# Patient Record
Sex: MALE | Race: WHITE | NOT HISPANIC OR LATINO | URBAN - METROPOLITAN AREA
[De-identification: names, ages, dates, MRNs, and addresses within clinical notes are randomized per-mention and may not be internally consistent; named-entity substitution may affect disease eponyms.]

---

## 2017-04-19 ENCOUNTER — COMMUNICATION - HEALTHEAST (OUTPATIENT)
Dept: TELEHEALTH | Facility: CLINIC | Age: 62
End: 2017-04-19

## 2017-04-19 ENCOUNTER — RECORDS - HEALTHEAST (OUTPATIENT)
Dept: ADMINISTRATIVE | Facility: OTHER | Age: 62
End: 2017-04-19

## 2017-04-19 ENCOUNTER — HOSPITAL ENCOUNTER (OUTPATIENT)
Dept: CT IMAGING | Facility: CLINIC | Age: 62
Discharge: HOME | End: 2017-04-19

## 2017-04-19 ENCOUNTER — RECORDS - HEALTHEAST (OUTPATIENT)
Dept: LAB | Facility: CLINIC | Age: 62
End: 2017-04-19

## 2017-04-19 DIAGNOSIS — N40.2 PROSTATE NODULE: ICD-10-CM

## 2017-04-19 DIAGNOSIS — C79.51 BONE METASTASIS: ICD-10-CM

## 2017-04-19 LAB
CHOLEST SERPL-MCNC: 228 MG/DL
FASTING STATUS PATIENT QL REPORTED: YES
HDLC SERPL-MCNC: 30 MG/DL
LDLC SERPL CALC-MCNC: 137 MG/DL
PSA SERPL-MCNC: 149.2 NG/ML (ref 0–4.5)
TRIGL SERPL-MCNC: 303 MG/DL

## 2017-04-20 LAB — HCV AB SERPL QL IA: NEGATIVE

## 2017-04-21 ENCOUNTER — RECORDS - HEALTHEAST (OUTPATIENT)
Dept: ADMINISTRATIVE | Facility: OTHER | Age: 62
End: 2017-04-21

## 2017-04-21 ENCOUNTER — HOSPITAL ENCOUNTER (OUTPATIENT)
Dept: CT IMAGING | Facility: CLINIC | Age: 62
Discharge: HOME | End: 2017-04-21

## 2017-04-21 DIAGNOSIS — C61 PROSTATE CANCER (H): ICD-10-CM

## 2017-04-21 LAB
ALBUMIN PERCENT: 64.4 % (ref 51–67)
ALBUMIN SERPL ELPH-MCNC: 4.5 G/DL (ref 3.2–4.7)
ALPHA 1 PERCENT: 2.6 % (ref 2–4)
ALPHA 2 PERCENT: 11.8 % (ref 5–13)
ALPHA1 GLOB SERPL ELPH-MCNC: 0.2 G/DL (ref 0.1–0.3)
ALPHA2 GLOB SERPL ELPH-MCNC: 0.8 G/DL (ref 0.4–0.9)
B-GLOBULIN SERPL ELPH-MCNC: 0.8 G/DL (ref 0.7–1.2)
BETA PERCENT: 11.9 % (ref 10–17)
GAMMA GLOB SERPL ELPH-MCNC: 0.7 G/DL (ref 0.6–1.4)
GAMMA GLOBULIN PERCENT: 9.3 % (ref 9–20)
PATH ICD:: NORMAL
PROT PATTERN SERPL ELPH-IMP: NORMAL
PROT SERPL-MCNC: 7 G/DL (ref 6–8)
REVIEWING PATHOLOGIST: NORMAL

## 2017-05-04 ENCOUNTER — RECORDS - HEALTHEAST (OUTPATIENT)
Dept: ADMINISTRATIVE | Facility: OTHER | Age: 62
End: 2017-05-04

## 2017-05-05 ENCOUNTER — HOSPITAL ENCOUNTER (OUTPATIENT)
Dept: INTERVENTIONAL RADIOLOGY/VASCULAR | Facility: HOSPITAL | Age: 62
Discharge: HOME | End: 2017-05-05
Attending: INTERNAL MEDICINE

## 2017-05-05 DIAGNOSIS — C61 PROSTATE CANCER (H): ICD-10-CM

## 2017-05-05 ASSESSMENT — MIFFLIN-ST. JEOR: SCORE: 1748.34

## 2017-05-08 ENCOUNTER — COMMUNICATION - HEALTHEAST (OUTPATIENT)
Dept: INTERVENTIONAL RADIOLOGY/VASCULAR | Facility: HOSPITAL | Age: 62
End: 2017-05-08

## 2017-08-10 ENCOUNTER — RECORDS - HEALTHEAST (OUTPATIENT)
Dept: ADMINISTRATIVE | Facility: OTHER | Age: 62
End: 2017-08-10

## 2017-09-07 ENCOUNTER — HOSPITAL ENCOUNTER (OUTPATIENT)
Dept: NUCLEAR MEDICINE | Facility: CLINIC | Age: 62
Discharge: HOME | End: 2017-09-07
Attending: INTERNAL MEDICINE

## 2017-09-07 DIAGNOSIS — C61 PROSTATE CANCER (H): ICD-10-CM

## 2017-09-08 ENCOUNTER — HOSPITAL ENCOUNTER (OUTPATIENT)
Dept: CT IMAGING | Facility: CLINIC | Age: 62
Discharge: HOME | End: 2017-09-08
Attending: INTERNAL MEDICINE

## 2017-09-08 DIAGNOSIS — C61 PROSTATE CANCER (H): ICD-10-CM

## 2017-09-11 ENCOUNTER — HOSPITAL ENCOUNTER (OUTPATIENT)
Dept: MRI IMAGING | Facility: HOSPITAL | Age: 62
Discharge: HOME | End: 2017-09-11
Attending: INTERNAL MEDICINE

## 2017-09-11 DIAGNOSIS — C61 PROSTATE CANCER (H): ICD-10-CM

## 2017-09-15 ENCOUNTER — RECORDS - HEALTHEAST (OUTPATIENT)
Dept: ADMINISTRATIVE | Facility: OTHER | Age: 62
End: 2017-09-15

## 2017-09-18 ENCOUNTER — HOSPITAL ENCOUNTER (OUTPATIENT)
Dept: INTERVENTIONAL RADIOLOGY/VASCULAR | Facility: HOSPITAL | Age: 62
Discharge: HOME | End: 2017-09-18
Attending: INTERNAL MEDICINE

## 2017-09-18 DIAGNOSIS — C61 PROSTATE CA (H): ICD-10-CM

## 2017-09-18 ASSESSMENT — MIFFLIN-ST. JEOR: SCORE: 1748.34

## 2017-10-24 ENCOUNTER — RECORDS - HEALTHEAST (OUTPATIENT)
Dept: ADMINISTRATIVE | Facility: OTHER | Age: 62
End: 2017-10-24

## 2017-12-18 ENCOUNTER — HOSPITAL ENCOUNTER (OUTPATIENT)
Dept: MRI IMAGING | Facility: HOSPITAL | Age: 62
Discharge: HOME | End: 2017-12-18
Attending: INTERNAL MEDICINE

## 2017-12-18 DIAGNOSIS — C61 PROSTATE CANCER (H): ICD-10-CM

## 2018-01-01 ENCOUNTER — HOSPITAL ENCOUNTER (OUTPATIENT)
Dept: CT IMAGING | Facility: HOSPITAL | Age: 63
Discharge: HOME | End: 2018-11-06
Attending: INTERNAL MEDICINE

## 2018-01-01 ENCOUNTER — HOSPITAL ENCOUNTER (OUTPATIENT)
Dept: CARDIOLOGY | Facility: CLINIC | Age: 63
Discharge: HOME | End: 2018-08-03

## 2018-01-01 ENCOUNTER — RECORDS - HEALTHEAST (OUTPATIENT)
Dept: ADMINISTRATIVE | Facility: OTHER | Age: 63
End: 2018-01-01

## 2018-01-01 ENCOUNTER — HOSPITAL ENCOUNTER (OUTPATIENT)
Dept: CT IMAGING | Facility: HOSPITAL | Age: 63
Discharge: HOME | End: 2018-11-30
Attending: INTERNAL MEDICINE | Admitting: RADIOLOGY

## 2018-01-01 ENCOUNTER — HOSPITAL ENCOUNTER (OUTPATIENT)
Dept: RADIOLOGY | Facility: HOSPITAL | Age: 63
Discharge: HOME | End: 2018-11-30
Attending: INTERNAL MEDICINE

## 2018-01-01 ENCOUNTER — HOSPITAL ENCOUNTER (OUTPATIENT)
Dept: MRI IMAGING | Facility: HOSPITAL | Age: 63
Discharge: HOME | End: 2018-11-06
Attending: INTERNAL MEDICINE

## 2018-01-01 ENCOUNTER — HOSPITAL ENCOUNTER (OUTPATIENT)
Dept: ULTRASOUND IMAGING | Facility: CLINIC | Age: 63
Discharge: HOME | End: 2018-08-02

## 2018-01-01 ENCOUNTER — HOSPITAL ENCOUNTER (OUTPATIENT)
Dept: MRI IMAGING | Facility: HOSPITAL | Age: 63
Discharge: HOME | End: 2018-08-14
Attending: INTERNAL MEDICINE

## 2018-01-01 DIAGNOSIS — Z98.890 S/P BIOPSY: ICD-10-CM

## 2018-01-01 DIAGNOSIS — C61 PROSTATE CANCER (H): ICD-10-CM

## 2018-01-01 DIAGNOSIS — R60.9 EDEMA: ICD-10-CM

## 2018-01-01 DIAGNOSIS — R91.1 LUNG NODULE: ICD-10-CM

## 2018-01-01 LAB
AORTIC ROOT: 3.2 CM
BSA FOR ECHO PROCEDURE: 2.24 M2
CREAT BLD-MCNC: 0.4 MG/DL
CREAT BLD-MCNC: 0.4 MG/DL (ref 0.7–1.3)
CREAT BLD-MCNC: 1.2 MG/DL (ref 0.7–1.3)
CV ECHO HEIGHT: 70 IN
CV ECHO WEIGHT: 224 LBS
DOP CALC LVOT AREA: 3.46 CM2
DOP CALC LVOT DIAMETER: 2.1 CM
DOP CALC LVOT PEAK VEL: 85 CM/S
DOP CALC LVOT STROKE VOLUME: 63.7 CM3
DOP CALCLVOT PEAK VEL VTI: 18.4 CM
EJECTION FRACTION: 66 % (ref 55–75)
FRACTIONAL SHORTENING: 26.1 % (ref 28–44)
HGB BLD-MCNC: 10.2 G/DL (ref 14–18)
INR PPP: 0.98 (ref 0.9–1.1)
INTERVENTRICULAR SEPTUM IN END DIASTOLE: 1 CM (ref 0.6–1)
IVS/PW RATIO: 0.8
LA AREA 1: 14.2 CM2
LA AREA 2: 13.3 CM2
LAB AP CHARGES (HE HISTORICAL CONVERSION): ABNORMAL
LAB AP INITIAL CYTO EVAL (HE HISTORICAL CONVERSION): ABNORMAL
LAB MED GENERAL PATH INTERP (HE HISTORICAL CONVERSION): ABNORMAL
LEFT ATRIUM LENGTH: 4.13 CM
LEFT ATRIUM SIZE: 3 CM
LEFT ATRIUM TO AORTIC ROOT RATIO: 0.94 NO UNITS
LEFT ATRIUM VOLUME INDEX: 17.4 ML/M2
LEFT ATRIUM VOLUME: 38.9 ML
LEFT VENTRICLE CARDIAC INDEX: 2.8 L/MIN/M2
LEFT VENTRICLE CARDIAC OUTPUT: 6.2 L/MIN
LEFT VENTRICLE DIASTOLIC VOLUME INDEX: 46.4 CM3/M2 (ref 34–74)
LEFT VENTRICLE DIASTOLIC VOLUME: 104 CM3 (ref 62–150)
LEFT VENTRICLE HEART RATE: 97 BPM
LEFT VENTRICLE MASS INDEX: 80.9 G/M2
LEFT VENTRICLE SYSTOLIC VOLUME INDEX: 15.6 CM3/M2 (ref 11–31)
LEFT VENTRICLE SYSTOLIC VOLUME: 35 CM3 (ref 21–61)
LEFT VENTRICULAR INTERNAL DIMENSION IN DIASTOLE: 4.6 CM (ref 4.2–5.8)
LEFT VENTRICULAR INTERNAL DIMENSION IN SYSTOLE: 3.4 CM (ref 2.5–4)
LEFT VENTRICULAR MASS: 181.2 G
LEFT VENTRICULAR OUTFLOW TRACT MEAN GRADIENT: 2 MMHG
LEFT VENTRICULAR OUTFLOW TRACT MEAN VELOCITY: 60.5 CM/S
LEFT VENTRICULAR OUTFLOW TRACT PEAK GRADIENT: 3 MMHG
LEFT VENTRICULAR POSTERIOR WALL IN END DIASTOLE: 1.2 CM (ref 0.6–1)
LV STROKE VOLUME INDEX: 28.4 ML/M2
MITRAL VALVE E/A RATIO: 0.6
MV AVERAGE E/E' RATIO: 7.5 CM/S
MV DECELERATION TIME: 77 MS
MV E'TISSUE VEL-LAT: 5.56 CM/S
MV E'TISSUE VEL-MED: 7.51 CM/S
MV LATERAL E/E' RATIO: 8.8
MV MEDIAL E/E' RATIO: 6.5
MV PEAK A VELOCITY: 77.5 CM/S
MV PEAK E VELOCITY: 48.9 CM/S
NUC REST DIASTOLIC VOLUME INDEX: 3584 LBS
NUC REST SYSTOLIC VOLUME INDEX: 70 IN
PATH REPORT.COMMENTS IMP SPEC: ABNORMAL
PATH REPORT.COMMENTS IMP SPEC: ABNORMAL
PATH REPORT.FINAL DX SPEC: ABNORMAL
PATH REPORT.MICROSCOPIC SPEC OTHER STN: ABNORMAL
PATH REPORT.RELEVANT HX SPEC: ABNORMAL
PLATELET # BLD AUTO: 84 THOU/UL (ref 140–440)
POC GFR AMER AF HE - HISTORICAL: >60 ML/MIN/1.73M2
POC GFR AMER AF HE - HISTORICAL: >60 ML/MIN/1.73M2
POC GFR NON AMER AF HE - HISTORICAL: 56 ML/MIN/1.73M2
POC GFR NON AMER AF HE - HISTORICAL: >60 ML/MIN/1.73M2
RESULT FLAG (HE HISTORICAL CONVERSION): ABNORMAL
RIGHT VENTRICULAR INTERNAL DIMENSION IN DYSTOLE: 3.4 CM
SPECIMEN DESCRIPTION: ABNORMAL
TRICUSPID VALVE ANULAR PLANE SYSTOLIC EXCURSION: 1.7 CM

## 2018-01-01 ASSESSMENT — MIFFLIN-ST. JEOR
SCORE: 1807.31
SCORE: 1807.31

## 2018-01-15 ENCOUNTER — RECORDS - HEALTHEAST (OUTPATIENT)
Dept: ADMINISTRATIVE | Facility: OTHER | Age: 63
End: 2018-01-15

## 2018-01-16 ENCOUNTER — HOSPITAL ENCOUNTER (OUTPATIENT)
Dept: CT IMAGING | Facility: CLINIC | Age: 63
Discharge: HOME | End: 2018-01-16
Attending: INTERNAL MEDICINE

## 2018-01-16 ENCOUNTER — HOSPITAL ENCOUNTER (OUTPATIENT)
Dept: NUCLEAR MEDICINE | Facility: CLINIC | Age: 63
Discharge: HOME | End: 2018-01-16
Attending: INTERNAL MEDICINE

## 2018-01-16 DIAGNOSIS — C61 PROSTATE CANCER (H): ICD-10-CM

## 2018-01-31 ENCOUNTER — RECORDS - HEALTHEAST (OUTPATIENT)
Dept: ADMINISTRATIVE | Facility: OTHER | Age: 63
End: 2018-01-31

## 2018-02-01 ENCOUNTER — HOSPITAL ENCOUNTER (OUTPATIENT)
Dept: INTERVENTIONAL RADIOLOGY/VASCULAR | Facility: HOSPITAL | Age: 63
Discharge: HOME | End: 2018-02-01
Attending: INTERNAL MEDICINE | Admitting: RADIOLOGY

## 2018-02-01 DIAGNOSIS — C61 PROSTATE CANCER (H): ICD-10-CM

## 2018-02-01 LAB
HGB BLD-MCNC: 13.8 G/DL (ref 14–18)
INR PPP: 1.06 (ref 0.9–1.1)
PLATELET # BLD AUTO: 219 THOU/UL (ref 140–440)

## 2018-02-01 ASSESSMENT — MIFFLIN-ST. JEOR: SCORE: 1807.31

## 2018-02-26 ENCOUNTER — RECORDS - HEALTHEAST (OUTPATIENT)
Dept: ADMINISTRATIVE | Facility: OTHER | Age: 63
End: 2018-02-26

## 2018-04-04 ENCOUNTER — HOSPITAL ENCOUNTER (OUTPATIENT)
Dept: MRI IMAGING | Facility: HOSPITAL | Age: 63
Discharge: HOME | End: 2018-04-04
Attending: INTERNAL MEDICINE

## 2018-04-04 ENCOUNTER — HOSPITAL ENCOUNTER (OUTPATIENT)
Dept: CT IMAGING | Facility: HOSPITAL | Age: 63
Discharge: HOME | End: 2018-04-04
Attending: INTERNAL MEDICINE

## 2018-04-04 DIAGNOSIS — C61 PROSTATE CANCER (H): ICD-10-CM

## 2018-04-04 LAB
CREAT BLD-MCNC: 0.8 MG/DL
POC GFR AMER AF HE - HISTORICAL: >60 ML/MIN/1.73M2
POC GFR NON AMER AF HE - HISTORICAL: >60 ML/MIN/1.73M2

## 2018-04-17 ENCOUNTER — RECORDS - HEALTHEAST (OUTPATIENT)
Dept: ADMINISTRATIVE | Facility: OTHER | Age: 63
End: 2018-04-17

## 2018-04-19 ENCOUNTER — HOSPITAL ENCOUNTER (OUTPATIENT)
Dept: MRI IMAGING | Facility: HOSPITAL | Age: 63
Discharge: HOME | End: 2018-04-19

## 2018-04-19 DIAGNOSIS — C61 PROSTATE CA (H): ICD-10-CM

## 2018-05-21 ENCOUNTER — RECORDS - HEALTHEAST (OUTPATIENT)
Dept: ADMINISTRATIVE | Facility: OTHER | Age: 63
End: 2018-05-21

## 2018-06-18 ENCOUNTER — HOSPITAL ENCOUNTER (OUTPATIENT)
Dept: CT IMAGING | Facility: HOSPITAL | Age: 63
Discharge: HOME | End: 2018-06-18
Attending: INTERNAL MEDICINE

## 2018-06-18 DIAGNOSIS — C61 PROSTATE CANCER (H): ICD-10-CM

## 2019-01-01 ENCOUNTER — HOME CARE/HOSPICE - HEALTHEAST (OUTPATIENT)
Dept: HOSPICE | Facility: HOSPICE | Age: 64
End: 2019-01-01

## 2019-01-01 ENCOUNTER — HOSPITAL ENCOUNTER (OUTPATIENT)
Dept: CT IMAGING | Facility: HOSPITAL | Age: 64
Discharge: HOME | End: 2019-05-07
Attending: NURSE PRACTITIONER

## 2019-01-01 ENCOUNTER — AMBULATORY - HEALTHEAST (OUTPATIENT)
Dept: SCHEDULING | Facility: CLINIC | Age: 64
End: 2019-01-01

## 2019-01-01 ENCOUNTER — RECORDS - HEALTHEAST (OUTPATIENT)
Dept: ADMINISTRATIVE | Facility: OTHER | Age: 64
End: 2019-01-01

## 2019-01-01 ENCOUNTER — RECORDS - HEALTHEAST (OUTPATIENT)
Dept: LAB | Facility: CLINIC | Age: 64
End: 2019-01-01

## 2019-01-01 ENCOUNTER — AMBULATORY - HEALTHEAST (OUTPATIENT)
Dept: HOSPICE | Facility: HOSPICE | Age: 64
End: 2019-01-01

## 2019-01-01 ENCOUNTER — INFUSION - HEALTHEAST (OUTPATIENT)
Dept: INFUSION THERAPY | Facility: HOSPITAL | Age: 64
End: 2019-01-01

## 2019-01-01 ENCOUNTER — HOSPITAL ENCOUNTER (OUTPATIENT)
Dept: CT IMAGING | Facility: HOSPITAL | Age: 64
Discharge: HOME | End: 2019-01-21
Attending: INTERNAL MEDICINE

## 2019-01-01 ENCOUNTER — AMBULATORY - HEALTHEAST (OUTPATIENT)
Dept: INFUSION THERAPY | Facility: HOSPITAL | Age: 64
End: 2019-01-01

## 2019-01-01 DIAGNOSIS — C61 PROSTATE CANCER (H): ICD-10-CM

## 2019-01-01 DIAGNOSIS — C61 PROSTATE CA (H): ICD-10-CM

## 2019-01-01 LAB
ABO/RH(D): NORMAL
ANION GAP SERPL CALCULATED.3IONS-SCNC: 9 MMOL/L (ref 5–18)
ANTIBODY SCREEN: NEGATIVE
BLD PROD TYP BPU: NORMAL
BLD PROD TYP BPU: NORMAL
BLOOD EXPIRATION DATE: NORMAL
BLOOD EXPIRATION DATE: NORMAL
BLOOD TYPE: 6200
BLOOD TYPE: 6200
BUN SERPL-MCNC: 22 MG/DL (ref 8–22)
CALCIUM SERPL-MCNC: 10.2 MG/DL (ref 8.5–10.5)
CHLORIDE BLD-SCNC: 105 MMOL/L (ref 98–107)
CO2 SERPL-SCNC: 26 MMOL/L (ref 22–31)
CODING SYSTEM: NORMAL
CODING SYSTEM: NORMAL
COMPONENT (HISTORICAL CONVERSION): NORMAL
COMPONENT (HISTORICAL CONVERSION): NORMAL
CREAT BLD-MCNC: 0.7 MG/DL (ref 0.7–1.3)
CREAT BLD-MCNC: 0.8 MG/DL
CREAT SERPL-MCNC: 0.86 MG/DL (ref 0.7–1.3)
CROSSMATCH: NORMAL
CROSSMATCH: NORMAL
GFR SERPL CREATININE-BSD FRML MDRD: >60 ML/MIN/1.73M2
GFR SERPL CREATININE-BSD FRML MDRD: >60 ML/MIN/1.73M2
GLUCOSE BLD-MCNC: 94 MG/DL (ref 70–125)
ISSUE DATE AND TIME: NORMAL
ISSUE DATE AND TIME: NORMAL
POC GFR AMER AF HE - HISTORICAL: >60 ML/MIN/1.73M2
POC GFR NON AMER AF HE - HISTORICAL: >60 ML/MIN/1.73M2
POTASSIUM BLD-SCNC: 4.8 MMOL/L (ref 3.5–5)
SODIUM SERPL-SCNC: 140 MMOL/L (ref 136–145)
STATUS (HISTORICAL CONVERSION): NORMAL
STATUS (HISTORICAL CONVERSION): NORMAL
UNIT ABO/RH (HISTORICAL CONVERSION): NORMAL
UNIT ABO/RH (HISTORICAL CONVERSION): NORMAL
UNIT NUMBER: NORMAL
UNIT NUMBER: NORMAL

## 2019-01-01 RX ORDER — MORPHINE SULFATE 100 MG/5ML
10 SOLUTION ORAL EVERY 4 HOURS
Status: SHIPPED | COMMUNITY
Start: 2019-01-01

## 2019-01-01 RX ORDER — LORAZEPAM 2 MG/ML
0.5 CONCENTRATE ORAL
Status: SHIPPED | COMMUNITY
Start: 2019-01-01

## 2019-01-01 RX ORDER — LORAZEPAM 2 MG/ML
0.5 CONCENTRATE ORAL EVERY 4 HOURS
Status: SHIPPED | COMMUNITY
Start: 2019-01-01

## 2019-01-01 RX ORDER — MORPHINE SULFATE 100 MG/5ML
5 SOLUTION ORAL
Status: SHIPPED | COMMUNITY
Start: 2019-01-01

## 2019-01-01 ASSESSMENT — MIFFLIN-ST. JEOR: SCORE: 1666.69

## 2021-05-30 VITALS — HEIGHT: 70 IN | BODY MASS INDEX: 30.21 KG/M2 | WEIGHT: 211 LBS

## 2021-05-31 VITALS — BODY MASS INDEX: 30.21 KG/M2 | WEIGHT: 211 LBS | HEIGHT: 70 IN

## 2021-05-31 VITALS — HEIGHT: 70 IN | BODY MASS INDEX: 32.07 KG/M2 | WEIGHT: 224 LBS

## 2021-06-01 VITALS — BODY MASS INDEX: 32.07 KG/M2 | HEIGHT: 70 IN | WEIGHT: 224 LBS

## 2021-06-02 VITALS — HEIGHT: 70 IN | WEIGHT: 224 LBS | BODY MASS INDEX: 32.07 KG/M2

## 2021-06-03 VITALS — BODY MASS INDEX: 27.63 KG/M2 | WEIGHT: 193 LBS | HEIGHT: 70 IN

## 2021-06-10 NOTE — BRIEF OP NOTE
[facility] Radiology Brief Op Note    IR PORT PLACEMENT >5 YEARS  Procedure Note    Name:  Cruz Cervantes  PCP:  Felix Cabello MD  Procedure Date: 5/5/2017       Post-Procedure Diagnosis:  1. Prostate cancer         Findings:    Rt IJ port.    Additional Staff:  --    Estimated Blood Loss:   Minimal    Specimens:    none    Complications:    None      Jony Hartley     Date: 5/5/2017  Time: 11:31 AM

## 2021-06-13 NOTE — H&P
"[facility] Radiology History and Physical Note    Procedure Requested: RIGHT IJ Port removal   Requesting Provider: Chandu Barbour MD    HPI: Cruz Cervantes is a 61 y.o. old male with H/O prostate cancer who is finished with treatment. Presents for removal of RIGHT IJ port.    NPO Status: MN  Anticoagulation/Antiplatelets/Bleeding tendencies: NONE   Antibiotics: NONE for IR     REVIEW OF SYMPTOMS: as above otherwise remainder 14 point ROS negative     PAST MEDICAL HISTORY:   Past Medical History:   Diagnosis Date     Cancer     prostate mets to spine     Hyperlipidemia        PAST SURGICAL HISTORY:  Past Surgical History:   Procedure Laterality Date     ADENOIDECTOMY       TONSILLECTOMY         ALLERGIES:  Review of patient's allergies indicates no known allergies.    MEDICATIONS:  Current Outpatient Prescriptions   Medication Sig Dispense Refill     cholecalciferol, vitamin D3, 1,000 unit tablet Take 2,000 Units by mouth daily.       multivitamin with minerals (THERA-M) 9 mg iron-400 mcg Tab tablet Take 1 tablet by mouth daily.       oxyCODONE (OXYCONTIN) 10 mg 12 hr tablet Take 10 mg by mouth every 12 (twelve) hours.       senna-docusate (SENNOSIDES-DOCUSATE SODIUM) 8.6-50 mg tablet Take 1 tablet by mouth daily.       No current facility-administered medications for this encounter.        LABS:  Lab Results   Component Value Date    INR 1.04 09/18/2017    INR 1.07 05/05/2017 9-13-17:  HGB 12    EXAM:  /75  Pulse (!) 101  Temp 98.2  F (36.8  C) (Oral)   Resp 18  Ht 5' 10\" (1.778 m)  Wt 211 lb (95.7 kg)  SpO2 95%  BMI 30.28 kg/m2    General: Stable. In no acute distress.  Neuro: A&O x 3.   Resp: Lungs clear to auscultation bilaterally.  Cardio: S1S2 and reg, without murmur, clicks or rubs  Abdomen: Soft, non-distended, non-tender, positive bowel sounds.    Pre-Sedation Assessment:  Mallampati Airway Classification: Class 2: upper half of tonsil fossa visible  Previous reaction to " anesthesia/sedation: no  Sedation plan based on assessment: Moderate  Sleep Apnea: no  Dentures: yes-upper and lower  COPD: no  ASA Classification: ASA 2 - Patient with mild systemic disease with no functional limitations    ASSESSMENT:   Prostate cancer; finished with treatment    PLAN: Removal of RIGHT IJ port.    The procedure, risks and moderate sedation were discussed with the patient, all questions answered and patient agrees to proceed with the procedure. Written consent obtained.    Jasmyne Bingham CNP  [facility]: Interventional Radiology   [phone]

## 2021-06-13 NOTE — PROCEDURES
Interventional Radiology Post-Procedure Note   [facility]  ?   Brief Procedure Note:   Patient name: Cruz Cervantes  Pt MRN:[id number]   Date of procedure: 9/18/2017     Procedure(s): Removal of Port-a-cath  Sedation method: Moderate sedation was employed. The patient was monitored by an interventional radiology nurse at all times throughout the procedure under my direct guidance.  Pre Procedure Diagnosis: Prostate cancer  Post Procedure Diagnosis: Same  Indications: Port-a-cath is no longer needed, completion of therapy   ?   Attending: Piyush Glass M.D.  Specimen(s) removed: Intact Port-a-cath and attached catheter, no pericatheter thrombus noted  Additional studies ordered: None  Drains: None   Estimated Blood Loss: Minimal  Complications: None  Vascular closure method: Manual pressure   Findings/Notes/Comments: Uncomplicated removal of Port-a-cath.    ?   Please see dictation in PACS or under the Imaging tab in [facility] for detailed procedure note.     Piyush Glass M.D.   Vascular and Interventional Radiology   Pager: [phone]   After Hours / Scheduling: [phone]     9/18/2017  1:41 PM

## 2021-06-15 NOTE — H&P
"[facility] Radiology History and Physical Note    Procedure Requested: port placement  Requesting Provider: Chandu Barbour MD    HPI: Cruz Cervantes is a 62 y.o. old male with metastatic prostate cancer who had developed anaplastic variant prostate adenocarcinoma wiyh substantial growth and PSA of 0.0. Here for port placement for additional chemotherapy.    Patient had previous right IJ port removed 9/18/17.  Would like port placed back on same side if possible.    NPO Status: midnight.  Anticoagulation/Antiplatelets/Bleeding tendencies: none  Antibiotics: ancef 2g IV ordered.    REVIEW OF SYMPTOMS: Denies recent fevers, chills, night sweats, abdominal pain, N/V/D.    PAST MEDICAL HISTORY:   Past Medical History:   Diagnosis Date     Cancer     prostate mets to spine     Hyperlipidemia        PAST SURGICAL HISTORY:  Past Surgical History:   Procedure Laterality Date     ADENOIDECTOMY       TONSILLECTOMY         ALLERGIES:  Review of patient's allergies indicates no known allergies.    MEDICATIONS:  Current Outpatient Prescriptions   Medication Sig Dispense Refill     cholecalciferol, vitamin D3, 1,000 unit tablet Take 2,000 Units by mouth daily.       multivitamin with minerals (THERA-M) 9 mg iron-400 mcg Tab tablet Take 1 tablet by mouth daily.       oxyCODONE (OXYCONTIN) 10 mg 12 hr tablet Take 10 mg by mouth every 12 (twelve) hours.       senna-docusate (SENNOSIDES-DOCUSATE SODIUM) 8.6-50 mg tablet Take 1 tablet by mouth daily.       No current facility-administered medications for this encounter.        LABS:  INR (no units)   Date Value   02/01/2018 1.06     Hemoglobin (g/dL)   Date Value   02/01/2018 13.8 (L)     Platelets (thou/uL)   Date Value   02/01/2018 219       EXAM:  /87  Pulse 97  Temp 97.9  F (36.6  C) (Oral)   Resp 18  Ht 5' 10\" (1.778 m)  Wt (!) 224 lb (101.6 kg)  SpO2 95%  BMI 32.14 kg/m2  General: Stable. In no acute distress.  Neuro: A&O x 3. Moves all extremities equally.  Resp: " Lungs clear to auscultation bilaterally.  Cardio: S1S2 and reg, without murmur, clicks or rubs  Skin: previous right sided port site without excoriations, ecchymosis, erythema, lesions or open sores.      Pre-Sedation Assessment:  Mallampati Airway Classification: Class 2: upper half of tonsil fossa visible  Previous reaction to anesthesia/sedation: no  Sedation plan based on assessment: Moderate  Sleep Apnea: no  Dentures: no  COPD: no  ASA Classification: ASA 3 - Patient with moderate systemic disease with functional limitations  Comments: no hx of asthma.    ASSESSMENT: 63 yo male with metastatic prostate cancer.    PLAN: port placement with sedation.    The procedure, risks and moderate sedation were discussed with the patient and wife, all questions answered and patient agrees to proceed with the procedure. Written consent obtained.    Landy Wharton PA-C  Interventional Radiology

## 2021-06-15 NOTE — PROCEDURES
[facility] RADIOLOGY  IR'    Proc: Venous port.  Meds: IV Versed and fentanyl.  Access: Rt IJV.  Complications: None immediate.

## 2021-06-16 PROBLEM — R00.0 TACHYCARDIA: Status: ACTIVE | Noted: 2019-01-01

## 2021-06-16 PROBLEM — D64.9 ANEMIA: Status: ACTIVE | Noted: 2019-01-01

## 2021-06-16 PROBLEM — R91.8 MULTIPLE LUNG NODULES: Status: ACTIVE | Noted: 2019-01-01

## 2021-06-16 PROBLEM — C61 PROSTATE CANCER METASTATIC TO MULTIPLE SITES (H): Status: ACTIVE | Noted: 2017-04-04

## 2021-06-16 PROBLEM — K92.2 GIB (GASTROINTESTINAL BLEEDING): Status: ACTIVE | Noted: 2019-01-01

## 2021-06-16 PROBLEM — K92.1 HEMATOCHEZIA: Status: ACTIVE | Noted: 2019-01-01

## 2021-06-17 NOTE — PATIENT INSTRUCTIONS - HE
Patient Instructions by Iwona Fleming RN at 3/20/2019  8:00 AM     Author: Iwona Fleming RN Service: -- Author Type: Registered Nurse    Filed: 3/20/2019  9:18 AM Encounter Date: 3/20/2019 Status: Signed    : Iwona Fleming RN (Registered Nurse)       Patient Education     When You Need a Blood Transfusion (Adult)  A blood transfusion may be done when you have lost blood because of an injury or during surgery. It can also be done because of diseases or conditions that affect the blood. Blood is made up of several different parts (blood products). You may receive some or all of these blood products during a transfusion. Blood for transfusion is usually donated from another person (donor). Strict measures are taken to make sure that donated blood is safe before it's given to you. This sheet helps you understand how a blood transfusion is done. Your healthcare provider will discuss your condition with you and answer your questions.    The parts of blood  Blood can be broken down into different parts that perform special roles in the body. These parts include:    Red blood cells, which carry oxygen throughout the body.    Platelets, which help stop bleeding.    Plasma (the liquid part of blood), which carries red blood cells and platelets throughout the body. Plasma also helps platelets in stopping bleeding.  Where does donated blood come from?    Volunteer donors. These are people who donate their blood to help others in need of blood. Blood donation can take place at several places, including a hospital, blood bank, or during a blood drive.    Directed donation. If you need a blood transfusion during a planned surgery, family and friends can have their blood tested for compatibility and donate blood for you before the surgery. This needs to be done at least 7 day(s) in advance. This is because the blood must be tested for safety.    Autologous donation. This is also called self-donation. For planned  surgery, you can donate your own blood starting up to 6 weeks before surgery.  Are blood transfusions safe?  Donated blood is tested and processed to make sure that the blood is safe:    The health and medical history of each donor is carefully screened. If a person is considered high-risk for infection or problems, he or she isn't accepted as a blood donor.    Donated blood is tested for infections such as hepatitis, syphilis, and HIV (the virus that causes AIDS). If the tested blood is found to be unsafe, it's destroyed.    Blood is divided into four types: A, B, AB, and O. Blood also has Rh types: positive (+) and negative (-). You can only receive blood products that are compatible with (match) your blood type. A sample of your blood is tested for compatibility with donated blood. This is done before blood products are prepared for a transfusion.  How is a blood transfusion done?  A blood transfusion takes place in a blood center, infusion center, hospital room, or operating room. Your healthcare provider will discuss the blood transfusion with you before it's done. You'll need to give permission for the blood transfusion by signing a consent form.    Two healthcare providers confirm your identity. They also confirm that they have the correct blood product(s) for you.    An intravenous (IV) line is placed in a vein if you do not already have an IV.    The blood product comes in a plastic bag that is hung on an IV pole. The blood product flows from the bag into your IV line. The IV line may be connected to a pump, which controls the transfusion rate. You may receive more than one kind of blood product through the IV.    Your vital signs (blood pressure, heart rate, respiratory rate, and temperature) are checked throughout the transfusion. This is to make sure you are not having a reaction to the blood product.    The IV line may be removed once the transfusion is complete.  Possible risks and complications of  blood transfusions  Most transfusions are problem free. In some cases, reactions occur. These can happen within seconds to minutes during the transfusion or a week to a few months after the transfusion. Call your doctor or nurse right away if you have any of the signs or symptoms in the table below during or after a transfusion:  Reaction Timing Symptoms   Allergic reaction (mild)   Within seconds to minutes during the transfusion    Up to 24 hours after the transfusion Hives or red welts on the skin, mild itching, rash, localized swelling, flushing (red face), wheezing, shortness of breath, or stridor (high-pitched noise or sound)   Anaphylactic reaction   Within seconds to minutes during the transfusion    Up to 24 hours after the transfusion Shortness of breath, flushing (red face), wheezing, labored (working hard) breathing, low blood pressure, localized swelling, chest tightness, or cramps   Febrile nonhemolytic reaction   Within minutes to hours during the transfusion    Within a few hours to 24 hours after the transfusion Fever (increase of 1  C or higher), chills, flushing (red face), nausea, headache, minor discomfort, or mild shortness of breath   Acute immune hemolytic reaction   Within minutes during the transfusion    Up to 24 hours after the transfusion Fever, red or brown urine, back pain, fast heart rate (tachycardia), abdominal pain, low blood pressure, feeling anxious, chills, chest pain, nausea, or fainting spells   Transfusion-related acute lung injury (TRALI)   Within 1 to 2 hours during the transfusion    Up to 6 hours after the transfusion Shortness of breath, trouble breathing, low blood pressure, fever, pulmonary edema   Transfusion-associated circulatory overload   Near the end of the transfusion    Within 6 hours after the transfusion Shortness of breath, fast heart rate (tachycardia), problems breathing when lying on back, abnormal blood pressure   Post-transfusion purpura (PUP)   Within  "1 week    Up to 48 days after the transfusion Purple spots on skin; nose bleed; bleeding from the urinary tract, abdomen, colon, or rectum; fever; or chills         \"Delayed\" transfusion-related acute lung injury (TRALI)   Within 72 hours (3 days) after the transfusion \"Sudden\" onset of respiratory distress or trouble breathing   \"Delayed\" hemolytic reaction   Within 3 to 7 days    Up to weeks after the transfusion Low-grade fever, mild jaundice (yellowing of the skin and whites of the eyes), decrease in hematocrit, chills, chest pain, back pain, nausea   Date Last Reviewed: 12/1/2016 2000-2017 [employer]. [address] [place] [address], [place] [address]. All rights reserved. This information is not intended as a substitute for professional medical care. Always follow your healthcare professional's instructions.                "

## 2021-06-22 NOTE — PROGRESS NOTES
11/30/18 0831   Provider Pre-Sedation Assessment   Difficult Airway?  No   Plan for Sedation Moderate   Assessment reviewed by proceduralist Yes   Update H&P Completed Updated H&P in Update H&P Note section

## 2021-06-22 NOTE — H&P
Admission History & Physical  Cruz Cervantes, 1955, [id number]    [facility]  Felix Cabello MD, [phone]   Code Status:  No Order     Extended Emergency Contact Information  Primary Emergency Contact: Hina Cervantes  Address: [address] [place] [address] [place]  Home Phone: [phone]  Mobile Phone: [phone]  Relation: Spouse     Assessment and Plan:   62 year old male presenting with history of prostate cancer and new pulmonary nodules. CT guided needle biopsy to be performed.     Chief Complaint: Prostate cancer, new pulmonary nodules.     HPI:    Cruz Cervantes is a 62 y.o. old male with history of prostate cancer.   History is provided by the patient.     Medical History  There are no active non-hospital problems to display for this patient.    Past Medical History:   Diagnosis Date     Cancer (H)      Hyperlipidemia      There are no active problems to display for this patient.    Surgical History  He  has a past surgical history that includes Tonsillectomy and Adenoidectomy.   Past Surgical History:   Procedure Laterality Date     ADENOIDECTOMY       TONSILLECTOMY      Social History  Reviewed, and he  reports that he quit smoking about 10 years ago. he has never used smokeless tobacco. He reports that he drinks alcohol. He reports that he does not use drugs.  Social History     Tobacco Use     Smoking status: Former Smoker     Last attempt to quit: 12/5/2007     Years since quitting: 10.9     Smokeless tobacco: Never Used   Substance Use Topics     Alcohol use: Yes     Comment: rarely      Allergies  No Known Allergies Family History  Reviewed, and family history is not on file.   Psychosocial Needs  Social History     Social History Narrative     Not on file     Additional psychosocial needs reviewed per nursing assessment.       Prior to Admission Medications     (Not in a hospital admission)        Review of Systems Unremarkable.  "    /59 (Patient Position: Lying)   Pulse 90   Temp 97.5  F (36.4  C) (Oral)   Resp 20   Ht 5' 10\" (1.778 m)   Wt (!) 224 lb (101.6 kg)   SpO2 100%   BMI 32.14 kg/m      Physical Exam Lungs clear. Normal heart sounds. Alert and oriented x 3.     Results:  Lab Results personally reviewed  Imaging Results personally reviewed  Reviewed old records   Discussed care with patient and wife for 10 minutes with greater than 50% of time spent in counseling and coordination of care.            "

## 2021-06-25 NOTE — PROGRESS NOTES
Pt arrived ambulatory to clinic for T&S and 2 units of PRBCs.  Port was accessed using aseptic technique without difficulties with excellent blood return.  Pt has never had blood transfusion before, so written information was provided while verbally discussing potential symptoms pt might experience while receiving blood products.   Administered blood products per MD order.  Pt tolerated transfusion well, no s/s of transfusion reaction.  Instructed pt to call MD if any delayed symptoms occur at home.  Port was flushed with NS and Heparin then de-accessed using 2x2 and papertape.  Pt verbalized understanding of plan of care and follow up with MD.

## 2024-03-13 NOTE — H&P
[facility] Radiology History and Physical Note    Procedure Requested: port placement  Requesting Provider: Chandu Barbour MD    HPI: Cruz Cervantes is a 61 y.o. old male with newly diagnosed metastatic prostate cancer with bony metastasis.  Patient here for port placement for anticipated chemotherapy initiation next Tuesday.     Discussed likely right sided port placement with patient    NPO Status: midnight.  Anticoagulation/Antiplatelets/Bleeding tendencies: none  Antibiotics: ancef ordered.    REVIEW OF SYMPTOMS: Denies recent fevers, chills, night sweats, abdominal pain, N/V/D.    PAST MEDICAL HISTORY:   Past Medical History:   Diagnosis Date     Cancer     prostate mets to spine     Hyperlipidemia        PAST SURGICAL HISTORY:  Past Surgical History:   Procedure Laterality Date     ADENOIDECTOMY       TONSILLECTOMY         ALLERGIES:  Review of patient's allergies indicates no known allergies.    MEDICATIONS:  Current Outpatient Prescriptions   Medication Sig Dispense Refill     cholecalciferol, vitamin D3, 1,000 unit tablet Take 2,000 Units by mouth daily.       multivitamin with minerals (THERA-M) 9 mg iron-400 mcg Tab tablet Take 1 tablet by mouth daily.       oxyCODONE (OXYCONTIN) 10 mg 12 hr tablet Take 10 mg by mouth every 12 (twelve) hours.       senna-docusate (SENNOSIDES-DOCUSATE SODIUM) 8.6-50 mg tablet Take 1 tablet by mouth daily.       Current Facility-Administered Medications   Medication Dose Route Frequency Provider Last Rate Last Dose     ceFAZolin 2 g in 100 mL in D5W (ANCEF)  2 g Intravenous 30 Min Pre-Op Agnieszka Hood CNP         lidocaine 10 mg/mL (1 %) injection 0.1-0.3 mL  0.1-0.3 mL Subcutaneous PRN Agnieszka Hood CNP         sodium chloride 0.9 % flush 3 mL (NS)  3 mL Intravenous Line Care Agnieszka Hood CNP           LABS:  INR (no units)   Date Value   05/05/2017 1.07     Hemoglobin (g/dL)   Date Value   05/05/2017 14.5     Platelets (thou/uL)   Date Value   05/05/2017 289  "      EXAM:  /86  Pulse (!) 106  Temp 97.4  F (36.3  C) (Oral)   Resp 18  Ht 5' 10\" (1.778 m)  Wt 211 lb (95.7 kg)  SpO2 96%  BMI 30.28 kg/m2  General: Stable. In no acute distress.  Neuro: A&O x 3. Moves all extremities equally.  Resp: Lungs clear to auscultation bilaterally.  Cardio: S1S2 and slightly tachycardic, without murmur, clicks or rubs  Skin: Without excoriations, ecchymosis, erythema, lesions or open sores on upper chest and neck.      Pre-Sedation Assessment:  Mallampati Airway Classification: Class 1: entire tonsil clearly visble  Previous reaction to anesthesia/sedation: no  Sedation plan based on assessment: Moderate  Sleep Apnea: no  Dentures: yes  COPD: no  ASA Classification: ASA 3 - Patient with moderate systemic disease with functional limitations  Comments: no hx of asthma.    ASSESSMENT: 60 yo male with prostate cancer.    PLAN: port placement with sedation.    The procedure, risks and moderate sedation were discussed with the patient and wife, all questions answered and patient agrees to proceed with the procedure. Written consent obtained.    Landy Wharton PA-C  Interventional Radiology    " 4 ft/bed to chair